# Patient Record
Sex: FEMALE | Race: WHITE
[De-identification: names, ages, dates, MRNs, and addresses within clinical notes are randomized per-mention and may not be internally consistent; named-entity substitution may affect disease eponyms.]

---

## 2021-08-18 ENCOUNTER — HOSPITAL ENCOUNTER (EMERGENCY)
Dept: HOSPITAL 54 - ER | Age: 76
Discharge: HOME | End: 2021-08-18
Payer: SELF-PAY

## 2021-08-18 VITALS — HEIGHT: 63 IN | BODY MASS INDEX: 30.12 KG/M2 | WEIGHT: 170 LBS

## 2021-08-18 VITALS — SYSTOLIC BLOOD PRESSURE: 142 MMHG | DIASTOLIC BLOOD PRESSURE: 83 MMHG

## 2021-08-18 DIAGNOSIS — Z71.1: ICD-10-CM

## 2021-08-18 DIAGNOSIS — Z60.2: ICD-10-CM

## 2021-08-18 DIAGNOSIS — R42: Primary | ICD-10-CM

## 2021-08-18 DIAGNOSIS — Z98.890: ICD-10-CM

## 2021-08-18 DIAGNOSIS — R21: ICD-10-CM

## 2021-08-18 SDOH — SOCIAL STABILITY - SOCIAL INSECURITY: PROBLEMS RELATED TO LIVING ALONE: Z60.2

## 2021-08-18 NOTE — NUR
Patient discharged to home in stable condition. Written and verbal after care 
instructions given. Patient verbalizes understanding of instruction. pt 
awaiting ride from insurance transportation service. wheeled to vehicle

## 2021-08-18 NOTE — NUR
pt bibra c/o of having an allergic reaction and pt verbalized "feeling drunk" 
after taking codeine. pt is aaox4. pt is connected to monitor and pulse ox. MD 
at bedside. noted redness on arms, no swelling noted upon assessment. skin is 
warm and dry. call light within reach.